# Patient Record
(demographics unavailable — no encounter records)

---

## 2024-12-09 NOTE — REVIEW OF SYSTEMS
[FreeTextEntry2] : 12 lb wt loss since July [FreeTextEntry8] : amenorrhea LMP 12/2023 [de-identified] : sleep disturbances [de-identified] : Hot flashes

## 2024-12-09 NOTE — PHYSICAL EXAM
[Chaperone Present] : A chaperone was present in the examining room during all aspects of the physical examination [Oriented x3] : oriented x3 [Examination Of The Breasts] : a normal appearance [No Masses] : no breast masses were palpable [Labia Majora] : normal [Labia Minora] : normal [Normal] : normal [Uterine Adnexae] : normal [FreeTextEntry2] :  Appropriately responsive, alert, and no acute distress. [FreeTextEntry3] : right thyroid nodule, nontender. No lymphadenopathy. [FreeTextEntry7] :  Soft. Nondistended. Nontender. No rebound or guarding. There are no palpable masses. [FreeTextEntry1] :  External genitalia are within normal limits with no lesions visualized. [FreeTextEntry4] :  No vaginal discharge, blood or any lesions noted within the vaginal vault. [FreeTextEntry5] :  A speculum was inserted without any difficulty. The cervix was normal in appearance. There is a 2 mm cyst at the 3 o'clock position of the cervix. Pap smear obtained. No cervical motion tenderness. [FreeTextEntry6] : Bimanual examination was notable for a normal, nontender uterus. There were no palpable adnexal masses or adnexal tenderness. [FreeTextEntry9] :  No lesions. No palpable masses.

## 2024-12-09 NOTE — HISTORY OF PRESENT ILLNESS
[Patient reported mammogram was normal] : Patient reported mammogram was normal [Patient reported PAP Smear was normal] : Patient reported PAP Smear was normal [Patient reported colonoscopy was normal] : Patient reported colonoscopy was normal [perimenopausal] : perimenopausal [N] : Patient does not use contraception [Y] : Positive pregnancy history [Menarche Age: ____] : age at menarche was [unfilled] [Experiencing Menopausal Sxs] : experiencing menopausal symptoms [No] : Patient does not have concerns regarding sex [Currently Active] : currently active [Men] : men [TextBox_4] :  51-year old  4 para 4 LMP 2023, presents for an annual examination. Review of systems hot flashes, difficulty sleeping [Mammogramdate] : 12/2023 [PapSmeardate] : 12/2023 [ColonoscopyDate] : 2019 [TextBox_102] : Irregular MPs [LMPDate] : 12/2023 [PGHxTotal] : 4 [[facility]] : 4 [[facility]Living] : 4 [TextBox_6] : 12/2023 [FreeTextEntry1] : 12/2023

## 2024-12-09 NOTE — PLAN
[FreeTextEntry1] : Wellness exam. Normal physical examination. Recommendations: Mammography and bilateral breast ultrasound.  Status post colonoscopy in 2019.  Vasomotor symptoms. Sleep disturbances. Information given on thermella.  Discussed proper nutrition and physical exercise. Reviewed age-appropriate vaccinations.